# Patient Record
Sex: MALE | Race: WHITE | URBAN - METROPOLITAN AREA
[De-identification: names, ages, dates, MRNs, and addresses within clinical notes are randomized per-mention and may not be internally consistent; named-entity substitution may affect disease eponyms.]

---

## 2020-10-13 ENCOUNTER — OFFICE VISIT (OUTPATIENT)
Dept: PRIMARY CARE CLINIC | Age: 55
End: 2020-10-13
Payer: COMMERCIAL

## 2020-10-13 ENCOUNTER — HOSPITAL ENCOUNTER (OUTPATIENT)
Age: 55
Discharge: HOME | End: 2020-10-15
Payer: COMMERCIAL

## 2020-10-13 VITALS
BODY MASS INDEX: 25.31 KG/M2 | OXYGEN SATURATION: 95 % | HEIGHT: 71 IN | HEART RATE: 72 BPM | TEMPERATURE: 96.8 F | WEIGHT: 180.8 LBS | DIASTOLIC BLOOD PRESSURE: 70 MMHG | SYSTOLIC BLOOD PRESSURE: 132 MMHG

## 2020-10-13 LAB
ALBUMIN SERPL-MCNC: 4.6 G/DL (ref 3.5–5.2)
ALP BLD-CCNC: 56 U/L (ref 40–129)
ALT SERPL-CCNC: 23 U/L (ref 0–40)
ANION GAP SERPL CALCULATED.3IONS-SCNC: 15 MMOL/L (ref 7–16)
AST SERPL-CCNC: 19 U/L (ref 0–39)
BACTERIA: ABNORMAL /HPF
BASOPHILS ABSOLUTE: 0.03 E9/L (ref 0–0.2)
BASOPHILS RELATIVE PERCENT: 0.3 % (ref 0–2)
BILIRUB SERPL-MCNC: 0.3 MG/DL (ref 0–1.2)
BILIRUBIN URINE: NEGATIVE
BLOOD, URINE: NORMAL
BUN BLDV-MCNC: 15 MG/DL (ref 6–20)
CALCIUM SERPL-MCNC: 9.9 MG/DL (ref 8.6–10.2)
CHLORIDE BLD-SCNC: 104 MMOL/L (ref 98–107)
CHOLESTEROL, TOTAL: 220 MG/DL (ref 0–199)
CLARITY: CLEAR
CO2: 22 MMOL/L (ref 22–29)
COLOR: YELLOW
CREAT SERPL-MCNC: 1.2 MG/DL (ref 0.7–1.2)
EOSINOPHILS ABSOLUTE: 0.32 E9/L (ref 0.05–0.5)
EOSINOPHILS RELATIVE PERCENT: 3.4 % (ref 0–6)
EPITHELIAL CELLS, UA: ABNORMAL /HPF
GFR AFRICAN AMERICAN: >60
GFR NON-AFRICAN AMERICAN: >60 ML/MIN/1.73
GLUCOSE BLD-MCNC: 87 MG/DL (ref 74–99)
GLUCOSE URINE: NEGATIVE MG/DL
HBA1C MFR BLD: 5.5 % (ref 4–5.6)
HCT VFR BLD CALC: 48.6 % (ref 37–54)
HDLC SERPL-MCNC: 40 MG/DL
HEMOGLOBIN: 16 G/DL (ref 12.5–16.5)
IMMATURE GRANULOCYTES #: 0.03 E9/L
IMMATURE GRANULOCYTES %: 0.3 % (ref 0–5)
KETONES, URINE: NEGATIVE MG/DL
LDL CHOLESTEROL CALCULATED: 152 MG/DL (ref 0–99)
LEUKOCYTE ESTERASE, URINE: NEGATIVE
LYMPHOCYTES ABSOLUTE: 2.59 E9/L (ref 1.5–4)
LYMPHOCYTES RELATIVE PERCENT: 27.8 % (ref 20–42)
MCH RBC QN AUTO: 29.2 PG (ref 26–35)
MCHC RBC AUTO-ENTMCNC: 32.9 % (ref 32–34.5)
MCV RBC AUTO: 88.7 FL (ref 80–99.9)
MONOCYTES ABSOLUTE: 0.71 E9/L (ref 0.1–0.95)
MONOCYTES RELATIVE PERCENT: 7.6 % (ref 2–12)
NEUTROPHILS ABSOLUTE: 5.63 E9/L (ref 1.8–7.3)
NEUTROPHILS RELATIVE PERCENT: 60.6 % (ref 43–80)
NITRITE, URINE: NEGATIVE
PDW BLD-RTO: 13.4 FL (ref 11.5–15)
PH UA: 6 (ref 5–9)
PLATELET # BLD: 252 E9/L (ref 130–450)
PMV BLD AUTO: 10.1 FL (ref 7–12)
POTASSIUM SERPL-SCNC: 4.9 MMOL/L (ref 3.5–5)
PROSTATE SPECIFIC ANTIGEN: 1.64 NG/ML (ref 0–4)
PROTEIN UA: NEGATIVE MG/DL
RBC # BLD: 5.48 E12/L (ref 3.8–5.8)
RBC UA: ABNORMAL /HPF (ref 0–2)
SODIUM BLD-SCNC: 141 MMOL/L (ref 132–146)
SPECIFIC GRAVITY UA: 1.01 (ref 1–1.03)
TOTAL PROTEIN: 7.5 G/DL (ref 6.4–8.3)
TRIGL SERPL-MCNC: 139 MG/DL (ref 0–149)
TSH SERPL DL<=0.05 MIU/L-ACNC: 1.54 UIU/ML (ref 0.27–4.2)
UROBILINOGEN, URINE: 0.2 E.U./DL
VITAMIN D 25-HYDROXY: 30 NG/ML (ref 30–100)
VLDLC SERPL CALC-MCNC: 28 MG/DL
WBC # BLD: 9.3 E9/L (ref 4.5–11.5)
WBC UA: ABNORMAL /HPF (ref 0–5)

## 2020-10-13 PROCEDURE — 84443 ASSAY THYROID STIM HORMONE: CPT

## 2020-10-13 PROCEDURE — 99386 PREV VISIT NEW AGE 40-64: CPT | Performed by: FAMILY MEDICINE

## 2020-10-13 PROCEDURE — 80053 COMPREHEN METABOLIC PANEL: CPT

## 2020-10-13 PROCEDURE — 83036 HEMOGLOBIN GLYCOSYLATED A1C: CPT

## 2020-10-13 PROCEDURE — 81001 URINALYSIS AUTO W/SCOPE: CPT

## 2020-10-13 PROCEDURE — G0103 PSA SCREENING: HCPCS

## 2020-10-13 PROCEDURE — 85025 COMPLETE CBC W/AUTO DIFF WBC: CPT

## 2020-10-13 PROCEDURE — 36415 COLL VENOUS BLD VENIPUNCTURE: CPT

## 2020-10-13 PROCEDURE — 82306 VITAMIN D 25 HYDROXY: CPT

## 2020-10-13 PROCEDURE — 80061 LIPID PANEL: CPT

## 2020-10-13 RX ORDER — ASCORBIC ACID 500 MG
1000 TABLET ORAL DAILY
COMMUNITY

## 2020-10-13 ASSESSMENT — ENCOUNTER SYMPTOMS
BACK PAIN: 0
SHORTNESS OF BREATH: 0
CONSTIPATION: 0
COUGH: 0
ABDOMINAL PAIN: 0
DIARRHEA: 0
VOMITING: 0
WHEEZING: 0
NAUSEA: 0

## 2020-10-13 ASSESSMENT — PATIENT HEALTH QUESTIONNAIRE - PHQ9
SUM OF ALL RESPONSES TO PHQ QUESTIONS 1-9: 0
SUM OF ALL RESPONSES TO PHQ9 QUESTIONS 1 & 2: 0
2. FEELING DOWN, DEPRESSED OR HOPELESS: 0
1. LITTLE INTEREST OR PLEASURE IN DOING THINGS: 0
SUM OF ALL RESPONSES TO PHQ QUESTIONS 1-9: 0

## 2020-10-13 NOTE — PROGRESS NOTES
10/13/20  Bertha Alas : 1965 Sex: male  Age: 48 y.o. Chief Complaint   Patient presents with    New Patient     shoulder issue, had a lump on neck      HPI:  48 y.o. male presents today to establish care with a new PCP. No former provider for many years. Patient's chart, medical, surgical and medication history all reviewed. Well Adult Physical  Patient here for a physical exam.  The patient reports problems - right shoulder pain. 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 Do you take any herbs or supplements that were not prescribed by a doctor? yes   Are you taking calcium supplements? no   Are you taking aspirin daily? no    Colonoscopy: No prior colonoscopy  Dental visit: Not yet  Vision check:  No Problems    Last time routine bloodwork was done: several years    Immunization status: Overdue for Tetanus, Shingles, Pneumo. Smoking status: current smoker    Physical activity:  infrequently      ROS:  Review of Systems   Constitutional: Negative for chills, fatigue and fever. Respiratory: Negative for cough, shortness of breath and wheezing. Cardiovascular: Negative for chest pain and palpitations. Gastrointestinal: Negative for abdominal pain, constipation, diarrhea, nausea and vomiting. Musculoskeletal: Positive for arthralgias. Negative for back pain. Skin: Negative for rash. Neurological: Negative for dizziness and headaches. Psychiatric/Behavioral: Negative for dysphoric mood. The patient is not nervous/anxious. All other systems reviewed and are negative. Current Outpatient Medications on File Prior to Visit   Medication Sig Dispense Refill    Multiple Vitamins-Minerals (OCUVITE ADULT 50+ PO) Take by mouth      vitamin C (ASCORBIC ACID) 500 MG tablet Take 1,000 mg by mouth daily       No current facility-administered medications on file prior to visit. No Known Allergies    History reviewed.  No pertinent past medical history. Past Surgical History:   Procedure Laterality Date    ELBOW SURGERY Left      Family History   Problem Relation Age of Onset    Lung Cancer Father     Cancer Sister      Social History     Socioeconomic History    Marital status:      Spouse name: Not on file    Number of children: Not on file    Years of education: Not on file    Highest education level: Not on file   Occupational History    Not on file   Social Needs    Financial resource strain: Not on file    Food insecurity     Worry: Not on file     Inability: Not on file    Transportation needs     Medical: Not on file     Non-medical: Not on file   Tobacco Use    Smoking status: Current Every Day Smoker     Packs/day: 1.00    Smokeless tobacco: Never Used   Substance and Sexual Activity    Alcohol use: No    Drug use: No    Sexual activity: Not on file   Lifestyle    Physical activity     Days per week: Not on file     Minutes per session: Not on file    Stress: Not on file   Relationships    Social connections     Talks on phone: Not on file     Gets together: Not on file     Attends Hindu service: Not on file     Active member of club or organization: Not on file     Attends meetings of clubs or organizations: Not on file     Relationship status: Not on file    Intimate partner violence     Fear of current or ex partner: Not on file     Emotionally abused: Not on file     Physically abused: Not on file     Forced sexual activity: Not on file   Other Topics Concern    Not on file   Social History Narrative    Not on file       Vitals:    10/13/20 1013   BP: 132/70   Pulse: 72   Temp: 96.8 °F (36 °C)   SpO2: 95%   Weight: 180 lb 12.8 oz (82 kg)   Height: 5' 11\" (1.803 m)       Physical Exam:  Physical Exam  Vitals signs and nursing note reviewed. Constitutional:       General: He is not in acute distress. Appearance: Normal appearance. He is well-developed.    HENT:      Head: Normocephalic and atraumatic. Right Ear: Hearing and external ear normal.      Left Ear: Hearing and external ear normal.      Nose:      Comments: Wearing mask  Eyes:      General: Lids are normal. No scleral icterus. Extraocular Movements: Extraocular movements intact. Conjunctiva/sclera: Conjunctivae normal.   Neck:      Musculoskeletal: Normal range of motion and neck supple. Thyroid: No thyromegaly. Cardiovascular:      Rate and Rhythm: Normal rate and regular rhythm. Heart sounds: Normal heart sounds. No murmur. Pulmonary:      Effort: Pulmonary effort is normal. No respiratory distress. Breath sounds: Normal breath sounds. No wheezing. Musculoskeletal: Normal range of motion. General: No deformity. Right shoulder: He exhibits tenderness (anterior shoulder). He exhibits normal range of motion, no swelling, no effusion, no crepitus, no deformity, no spasm and normal strength. Right lower leg: No edema. Left lower leg: No edema. Lymphadenopathy:      Cervical: No cervical adenopathy. Skin:     General: Skin is warm and dry. Findings: No rash. Neurological:      General: No focal deficit present. Mental Status: He is alert and oriented to person, place, and time. Gait: Gait normal.   Psychiatric:         Mood and Affect: Mood and affect normal.         Speech: Speech normal.         Behavior: Behavior normal.         Thought Content:  Thought content normal.         Labs:  CBC with Differential:    Lab Results   Component Value Date    WBC 16.9 06/12/2013    RBC 5.08 06/12/2013    HGB 15.1 06/12/2013    HCT 45.9 06/12/2013     06/12/2013    MCV 90.4 06/12/2013    MCH 29.8 06/12/2013    MCHC 33.0 06/12/2013    RDW 12.6 06/12/2013    SEGSPCT 82 06/12/2013    LYMPHOPCT 10 06/12/2013    MONOPCT 7 06/12/2013    BASOPCT 0 06/12/2013    MONOSABS 1.30 06/12/2013    LYMPHSABS 1.70 06/12/2013    EOSABS 0.10 06/12/2013    BASOSABS 0.00 06/12/2013     CMP: Lab Results   Component Value Date     06/12/2013    K 3.9 06/12/2013     06/12/2013    CO2 27 06/12/2013    BUN 15 06/12/2013    CREATININE 1.0 06/12/2013    LABGLOM >60 06/12/2013    GLUCOSE 71 06/12/2013    CALCIUM 10.0 06/12/2013     HgBA1c:  No results found for: LABA1C  Microalbumen/Creatinine ratio:  No components found for: RUCREAT  FLP:  No results found for: TRIG, HDL, LDLCALC, LDLDIRECT, LABVLDL  TSH:  No results found for: TSH  PSA: No results found for: PSA       Assessment and Plan:  Kadeem Soto was seen today for new patient. Diagnoses and all orders for this visit:    Encounter for well adult exam without abnormal findings  -     CBC Auto Differential; Future  -     Comprehensive Metabolic Panel; Future  -     TSH without Reflex; Future  -     Urinalysis; Future  Healthy 49 yo male at this time. Due for screening labs. Discussed colon cancer screen, but he would like to wait right now. Vitamin D insufficiency  -     Vitamin D 25 Hydroxy; Future    Impaired fasting glucose  -     Hemoglobin A1C; Future    Screening for prostate cancer  -     Psa screening; Future    Screening for cardiovascular condition  -     Lipid Panel; Future    Acute pain of right shoulder  -     XR SHOULDER RIGHT (MIN 2 VIEWS); Future  Xray reviewed by myself prior to radiology review and is normal.  Suspect biceps tendonitis. Discussed ibuprofen, ice and rest for the week while he's off. If no improvement, will do injection. Return in about 1 year (around 10/13/2021), or if symptoms worsen or fail to improve, for Well visit.       Seen By:  Chauncey Figueroa, DO